# Patient Record
Sex: MALE | Race: WHITE | Employment: FULL TIME | URBAN - METROPOLITAN AREA
[De-identification: names, ages, dates, MRNs, and addresses within clinical notes are randomized per-mention and may not be internally consistent; named-entity substitution may affect disease eponyms.]

---

## 2022-11-27 ENCOUNTER — HOSPITAL ENCOUNTER (OUTPATIENT)
Age: 47
Discharge: HOME | End: 2022-11-27
Payer: COMMERCIAL

## 2022-11-27 VITALS
BODY MASS INDEX: 23.32 KG/M2 | TEMPERATURE: 99 F | WEIGHT: 140 LBS | SYSTOLIC BLOOD PRESSURE: 157 MMHG | RESPIRATION RATE: 16 BRPM | OXYGEN SATURATION: 99 % | HEIGHT: 65 IN | DIASTOLIC BLOOD PRESSURE: 87 MMHG | HEART RATE: 61 BPM

## 2022-11-27 DIAGNOSIS — Z20.822 LAB TEST NEGATIVE FOR COVID-19 VIRUS: ICD-10-CM

## 2022-11-27 DIAGNOSIS — Z20.822 ENCOUNTER FOR LABORATORY TESTING FOR COVID-19 VIRUS: ICD-10-CM

## 2022-11-27 DIAGNOSIS — J06.9 VIRAL UPPER RESPIRATORY TRACT INFECTION: Primary | ICD-10-CM

## 2022-11-27 LAB — SARS-COV-2 RNA RESP QL NAA+PROBE: NOT DETECTED

## 2022-11-27 RX ORDER — ATORVASTATIN CALCIUM 10 MG/1
TABLET, FILM COATED ORAL
COMMUNITY
Start: 2022-11-12

## 2022-11-27 NOTE — ED INITIAL ASSESSMENT (HPI)
Pt presents to the IC with c/o headache, cough, congestion and chills at home. No fevers. Pt is concerned about covid with a new baby at home.

## 2022-11-27 NOTE — DISCHARGE INSTRUCTIONS
Recommend over-the-counter Flonase and Zyrtec to help with cough congestion runny nose or postnasal drip. Take ibuprofen or Tylenol for pain. You may wear a mask when you are around your  and good hand hygiene. Follow-up with your primary care provider if symptoms persist or worsen.

## 2023-03-09 ENCOUNTER — OFFICE VISIT (OUTPATIENT)
Dept: GASTROENTEROLOGY | Facility: CLINIC | Age: 48
End: 2023-03-09
Payer: COMMERCIAL

## 2023-03-09 ENCOUNTER — TELEPHONE (OUTPATIENT)
Dept: GASTROENTEROLOGY | Facility: CLINIC | Age: 48
End: 2023-03-09

## 2023-03-09 VITALS — WEIGHT: 148.38 LBS | BODY MASS INDEX: 25 KG/M2

## 2023-03-09 DIAGNOSIS — Z12.11 COLON CANCER SCREENING: Primary | ICD-10-CM

## 2023-03-09 DIAGNOSIS — Z12.11 SCREEN FOR COLON CANCER: Primary | ICD-10-CM

## 2023-03-09 PROCEDURE — S0285 CNSLT BEFORE SCREEN COLONOSC: HCPCS | Performed by: INTERNAL MEDICINE

## 2023-03-09 NOTE — PATIENT INSTRUCTIONS
1. Schedule colonoscopy with [facility] [Diagnosis: screening]    2.  bowel prep from pharmacy (single dose Golytely)    3. Continue all medications as normal for your procedure. 4. Read all bowel prep instructions carefully. Bowel prep instructions can also be found online at:  www.health.org/giprep     5. AVOID seeds, nuts, popcorn, raw fruits and vegetables for 3 days before procedure    6. You MAY need to go for COVID testing 72 hours before procedure. The testing team will call you a few days before your procedure to discuss with you if testing is required. If you are asked to go for COVID testing and do not completed the test, the procedure cannot be performed. 7. If you start any NEW medication after your visit today, please notify us. Certain medications (like iron or weight loss medications) will need to be held before the procedure, or the procedure cannot be performed safely.

## 2023-03-09 NOTE — TELEPHONE ENCOUNTER
Scheduled for:  Colonoscopy [address] or [phone]  Provider Name:  Dr. Betzaida Pillai  Date:  6/7/23  Location:  [facility]  Sedation: [facility]   Time: 10:00 am(pt is aware to arrive at 9:00 am)  Prep: single dose golytely  Meds/Allergies Reconciled?:  Physician reviewed      Diagnosis with codes:  Colon screening z12.11  Was patient informed to call insurance with codes (Y/N):  Yes   Referral sent?:yes       [address] [place] [address] or [address] [place] [address] notified?:  I sent an electronic request to Endo Scheduling and received a confirmation today. Medication Orders:  Pt is aware to NOT take iron pills, herbal meds and diet supplements for 7 days before exam. Also to NOT take any form of alcohol, recreational drugs and any forms of ED meds 24 hours before exam.   Misc Orders:       Further instructions given by staff:  I discussed the prep instructions with the patient which he  verbally understood and is aware that I will mail the instructions today.

## 2023-03-17 ENCOUNTER — TELEPHONE (OUTPATIENT)
Facility: CLINIC | Age: 48
End: 2023-03-17

## 2023-03-17 DIAGNOSIS — Z12.11 SCREENING FOR COLON CANCER: Primary | ICD-10-CM

## 2023-04-09 ENCOUNTER — HOSPITAL ENCOUNTER (OUTPATIENT)
Age: 48
Discharge: HOME | End: 2023-04-09
Payer: COMMERCIAL

## 2023-04-09 VITALS
RESPIRATION RATE: 20 BRPM | TEMPERATURE: 98 F | HEART RATE: 98 BPM | DIASTOLIC BLOOD PRESSURE: 99 MMHG | OXYGEN SATURATION: 98 % | SYSTOLIC BLOOD PRESSURE: 142 MMHG

## 2023-04-09 DIAGNOSIS — B96.89 ACUTE BACTERIAL SINUSITIS: Primary | ICD-10-CM

## 2023-04-09 DIAGNOSIS — J01.90 ACUTE BACTERIAL SINUSITIS: Primary | ICD-10-CM

## 2023-04-09 DIAGNOSIS — Z20.822 ENCOUNTER FOR LABORATORY TESTING FOR COVID-19 VIRUS: ICD-10-CM

## 2023-04-09 LAB
S PYO AG THROAT QL: NEGATIVE
SARS-COV-2 RNA RESP QL NAA+PROBE: NOT DETECTED

## 2023-04-09 RX ORDER — FLUTICASONE PROPIONATE 50 MCG
2 SPRAY, SUSPENSION (ML) NASAL DAILY
Qty: 16 G | Refills: 0 | Status: SHIPPED | OUTPATIENT
Start: 2023-04-09 | End: 2023-05-09

## 2023-04-09 RX ORDER — AMOXICILLIN AND CLAVULANATE POTASSIUM 875; 125 MG/1; MG/1
1 TABLET, FILM COATED ORAL 2 TIMES DAILY
Qty: 20 TABLET | Refills: 0 | Status: SHIPPED | OUTPATIENT
Start: 2023-04-09 | End: 2023-04-19

## 2023-04-09 NOTE — ED INITIAL ASSESSMENT (HPI)
Cough, ha, nasal congestion x 6 days. Since this past Friday (2 days) smell a fuel smell and it won't go away.

## 2023-06-14 ENCOUNTER — HOSPITAL ENCOUNTER (OUTPATIENT)
Facility: HOSPITAL | Age: 48
Setting detail: HOSPITAL OUTPATIENT SURGERY
Discharge: HOME | End: 2023-06-14
Attending: INTERNAL MEDICINE | Admitting: INTERNAL MEDICINE
Payer: COMMERCIAL

## 2023-06-14 ENCOUNTER — ANESTHESIA EVENT (OUTPATIENT)
Dept: ENDOSCOPY | Facility: HOSPITAL | Age: 48
End: 2023-06-14
Payer: COMMERCIAL

## 2023-06-14 ENCOUNTER — ANESTHESIA (OUTPATIENT)
Dept: ENDOSCOPY | Facility: HOSPITAL | Age: 48
End: 2023-06-14
Payer: COMMERCIAL

## 2023-06-14 VITALS
OXYGEN SATURATION: 100 % | HEIGHT: 65 IN | HEART RATE: 38 BPM | DIASTOLIC BLOOD PRESSURE: 82 MMHG | BODY MASS INDEX: 23.32 KG/M2 | RESPIRATION RATE: 15 BRPM | SYSTOLIC BLOOD PRESSURE: 119 MMHG | WEIGHT: 140 LBS

## 2023-06-14 DIAGNOSIS — Z12.11 COLON CANCER SCREENING: ICD-10-CM

## 2023-06-14 PROCEDURE — 45385 COLONOSCOPY W/LESION REMOVAL: CPT | Performed by: INTERNAL MEDICINE

## 2023-06-14 PROCEDURE — 0DBK8ZX EXCISION OF ASCENDING COLON, VIA NATURAL OR ARTIFICIAL OPENING ENDOSCOPIC, DIAGNOSTIC: ICD-10-PCS | Performed by: INTERNAL MEDICINE

## 2023-06-14 PROCEDURE — 0DBN8ZX EXCISION OF SIGMOID COLON, VIA NATURAL OR ARTIFICIAL OPENING ENDOSCOPIC, DIAGNOSTIC: ICD-10-PCS | Performed by: INTERNAL MEDICINE

## 2023-06-14 PROCEDURE — 0DBH8ZX EXCISION OF CECUM, VIA NATURAL OR ARTIFICIAL OPENING ENDOSCOPIC, DIAGNOSTIC: ICD-10-PCS | Performed by: INTERNAL MEDICINE

## 2023-06-14 RX ORDER — SODIUM CHLORIDE, SODIUM LACTATE, POTASSIUM CHLORIDE, CALCIUM CHLORIDE 600; 310; 30; 20 MG/100ML; MG/100ML; MG/100ML; MG/100ML
INJECTION, SOLUTION INTRAVENOUS CONTINUOUS
Status: DISCONTINUED | OUTPATIENT
Start: 2023-06-14 | End: 2023-06-14

## 2023-06-14 RX ORDER — LIDOCAINE HYDROCHLORIDE 10 MG/ML
INJECTION, SOLUTION EPIDURAL; INFILTRATION; INTRACAUDAL; PERINEURAL AS NEEDED
Status: DISCONTINUED | OUTPATIENT
Start: 2023-06-14 | End: 2023-06-14 | Stop reason: SURG

## 2023-06-14 RX ORDER — SODIUM CHLORIDE 0.9 % (FLUSH) 0.9 %
10 SYRINGE (ML) INJECTION AS NEEDED
Status: DISCONTINUED | OUTPATIENT
Start: 2023-06-14 | End: 2023-06-14

## 2023-06-14 RX ADMIN — LIDOCAINE HYDROCHLORIDE 50 MG: 10 INJECTION, SOLUTION EPIDURAL; INFILTRATION; INTRACAUDAL; PERINEURAL at 10:36:00

## 2023-06-14 RX ADMIN — SODIUM CHLORIDE, SODIUM LACTATE, POTASSIUM CHLORIDE, CALCIUM CHLORIDE: 600; 310; 30; 20 INJECTION, SOLUTION INTRAVENOUS at 10:33:00

## 2023-06-14 RX ADMIN — SODIUM CHLORIDE, SODIUM LACTATE, POTASSIUM CHLORIDE, CALCIUM CHLORIDE: 600; 310; 30; 20 INJECTION, SOLUTION INTRAVENOUS at 10:56:00

## 2023-06-14 NOTE — DISCHARGE INSTRUCTIONS

## 2023-06-14 NOTE — ANESTHESIA POSTPROCEDURE EVALUATION
Patient: Beau Lat    Procedure Summary     Date: 06/14/23 Room / Location: [address] [place] [address] ENDOSCOPY 01 / [address] [place] [address] ENDOSCOPY    Anesthesia Start: 1033 Anesthesia Stop: 1100    Procedure: COLONOSCOPY Diagnosis:       Colon cancer screening      (polyps, hemorrhoids )    Surgeons: Rosi Macdonald MD Anesthesiologist: Mikayla Platt CRNA    Anesthesia Type: general ASA Status: 1          Anesthesia Type: general    Vitals Value Taken Time   /69 06/14/23 1100   Temp 97 06/14/23 1100   Pulse 55 06/14/23 1059   Resp 16 06/14/23 1100   SpO2 98 % 06/14/23 1059   Vitals shown include unvalidated device data.     [address] [place] [address] AN Post Evaluation:   Patient Evaluated in PACU  Patient Participation: complete - patient participated  Level of Consciousness: awake  Pain Score: 0  Pain Management: adequate  Airway Patency:patent  Dental exam unchanged from preop  Yes    Cardiovascular Status: acceptable  Respiratory Status: acceptable  Postoperative Hydration acceptable      Charlie Long CRNA  6/14/2023 11:00 AM

## 2023-06-21 ENCOUNTER — TELEPHONE (OUTPATIENT)
Facility: CLINIC | Age: 48
End: 2023-06-21

## 2023-06-21 NOTE — TELEPHONE ENCOUNTER
Tubular adenomas x3 d/w patient - repeat CLN in 3 year. He agrees to plan.         GI staff: please place recall for colonoscopy in 3 years

## 2023-06-21 NOTE — TELEPHONE ENCOUNTER
Health maintenance updated. Last colonoscopy done 6/14/23. 3 year recall placed into Pt Outreach, next due on 6/14/26 per Dr. Ronny Rubin.

## 2024-12-19 ENCOUNTER — HOSPITAL ENCOUNTER (OUTPATIENT)
Age: 49
Discharge: HOME | End: 2024-12-19
Payer: COMMERCIAL

## 2024-12-19 VITALS
HEART RATE: 63 BPM | RESPIRATION RATE: 18 BRPM | SYSTOLIC BLOOD PRESSURE: 143 MMHG | TEMPERATURE: 99 F | OXYGEN SATURATION: 100 % | DIASTOLIC BLOOD PRESSURE: 79 MMHG

## 2024-12-19 DIAGNOSIS — R52 BODY ACHES: ICD-10-CM

## 2024-12-19 DIAGNOSIS — J06.9 VIRAL URI WITH COUGH: Primary | ICD-10-CM

## 2024-12-19 DIAGNOSIS — Z20.822 ENCOUNTER FOR LABORATORY TESTING FOR COVID-19 VIRUS: ICD-10-CM

## 2024-12-19 LAB
POCT INFLUENZA A: NEGATIVE
POCT INFLUENZA B: NEGATIVE
S PYO AG THROAT QL: NEGATIVE
SARS-COV-2 RNA RESP QL NAA+PROBE: NOT DETECTED

## 2024-12-19 PROCEDURE — U0002 COVID-19 LAB TEST NON-CDC: HCPCS | Performed by: NURSE PRACTITIONER

## 2024-12-19 PROCEDURE — 87880 STREP A ASSAY W/OPTIC: CPT | Performed by: NURSE PRACTITIONER

## 2024-12-19 PROCEDURE — 87502 INFLUENZA DNA AMP PROBE: CPT | Performed by: NURSE PRACTITIONER

## 2024-12-19 PROCEDURE — 99214 OFFICE O/P EST MOD 30 MIN: CPT | Performed by: NURSE PRACTITIONER

## 2024-12-19 RX ORDER — BENZONATATE 100 MG/1
100 CAPSULE ORAL 3 TIMES DAILY PRN
Qty: 30 CAPSULE | Refills: 0 | Status: SHIPPED | OUTPATIENT
Start: 2024-12-19 | End: 2024-12-29

## 2024-12-19 NOTE — ED PROVIDER NOTES
Patient Seen in: [facility]      History     Chief Complaint   Patient presents with    Sore Throat    Cough/URI     Stated Complaint: Cough    Subjective: This is a 49-year-old male, past medical history of hyperlipidemia, presents to immediate care clinic for evaluation of fever, chills, headache, body aches, cough, congestion, runny nose, sneezing, sore throat for the past 3 days.  Patient states symptoms feel somewhat similar to when he had strep throat over the summer.  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  He is afebrile on arrival.  He is without abdominal pain, nausea, vomiting, diarrhea.  He has not been taking any over-the-counter medications, states he has been focusing on staying well-hydrated, getting plenty of rest, warm tea.  Patient is speaking in complete full sentences without difficulty.  AOx4.  The history is provided by the patient.             Objective:     Past Medical History:    Hyperlipidemia    Tubular adenoma of colon    repeat CLN in 2026              Past Surgical History:   Procedure Laterality Date    Colonoscopy N/A 6/14/2023    Procedure: COLONOSCOPY;  Surgeon: CARO Wood MD;  Location: [facility] ENDOSCOPY                Social History     Socioeconomic History    Marital status:    Tobacco Use    Smoking status: Never     Passive exposure: Never    Smokeless tobacco: Never   Substance and Sexual Activity    Alcohol use: Yes     Comment: socially    Drug use: Yes     Frequency: 2.0 times per week     Types: Cannabis     Comment: week ago     Social Drivers of Health      Received from [facility]    Housing Stability              Review of Systems   Constitutional:  Positive for activity change, appetite change, chills, fatigue and fever.   HENT:  Positive for congestion, postnasal drip, rhinorrhea, sneezing and sore throat. Negative for ear discharge and ear pain.    Eyes: Negative.    Respiratory:  Positive  for cough. Negative for chest tightness, shortness of breath, wheezing and stridor.    Cardiovascular: Negative.    Gastrointestinal: Negative.    Genitourinary: Negative.    Musculoskeletal:  Positive for arthralgias and myalgias.   Skin: Negative.    Neurological:  Positive for headaches. Negative for dizziness, weakness and light-headedness.       Positive for stated complaint: Cough  Other systems are as noted in HPI.  Constitutional and vital signs reviewed.      All other systems reviewed and negative except as noted above.    Physical Exam     ED Triage Vitals   BP 12/19/24 1619 143/79   Pulse 12/19/24 1619 63   Resp 12/19/24 1617 18   Temp 12/19/24 1617 98.9 °F (37.2 °C)   Temp src 12/19/24 1617 Oral   SpO2 12/19/24 1617 100 %   O2 Device 12/19/24 1617 None (Room air)       Current Vitals:   Vital Signs  BP: 143/79  Pulse: 63  Resp: 18  Temp: 98.9 °F (37.2 °C)  Temp src: Oral    Oxygen Therapy  SpO2: 100 %  O2 Device: None (Room air)        Physical Exam  Constitutional:       Appearance: He is well-developed.   HENT:      Head: Normocephalic.      Right Ear: Tympanic membrane and ear canal normal.      Left Ear: Tympanic membrane and ear canal normal.      Nose: Congestion and rhinorrhea present.      Mouth/Throat:      Mouth: Mucous membranes are moist. No oral lesions.      Pharynx: Uvula midline. Posterior oropharyngeal erythema present. No pharyngeal swelling, oropharyngeal exudate or uvula swelling.      Tonsils: No tonsillar exudate or tonsillar abscesses.   Eyes:      Conjunctiva/sclera: Conjunctivae normal.      Pupils: Pupils are equal, round, and reactive to light.   Cardiovascular:      Rate and Rhythm: Normal rate and regular rhythm.      Heart sounds: Normal heart sounds. No murmur heard.  Pulmonary:      Effort: Pulmonary effort is normal. No respiratory distress.      Breath sounds: No stridor. No wheezing, rhonchi or rales.   Chest:      Chest wall: No tenderness.   Musculoskeletal:       Cervical back: Normal range of motion.   Lymphadenopathy:      Cervical: No cervical adenopathy.   Skin:     General: Skin is warm.      Capillary Refill: Capillary refill takes less than 2 seconds.   Neurological:      General: No focal deficit present.      Mental Status: He is alert and oriented to person, place, and time.             ED Course     Labs Reviewed   POCT RAPID STREP - Normal   POCT FLU TEST - Normal    Narrative:     This assay is a rapid molecular in vitro test utilizing nucleic acid amplification of influenza A and B viral RNA.   RAPID SARS-COV-2 BY PCR - Normal                   MDM      Differentials considered include: COVID-19, influenza A, influenza B, strep throat, viral URI, pneumonia.    Patient is negative for COVID-19.  His lungs are clear on exam.  No wheezing, coarse or diminished breath sounds.  No rhonchi or rales.  No tachypnea, tachycardia, hypoxia.  No evidence to suggest pneumonia.  Chest x-ray withheld.    Patient is negative for strep throat.  Posterior pharynx with mild erythema, however, postnasal drip in appearance.  Uvula is midline normal in size.  No tonsillar enlargement, edema, exudate.  No cervical lymphadenopathy.  Patient is tolerating his own secretions well without difficulty.  No excessive drooling. No evidence of PTA.    Patient is negative for influenza A and influenza B.    Symptoms are likely viral in nature.  Tessalon Perles sent to pharmacy to help suppress cough.  Patient is aware of additional supportive and symptomatic management at home.    Educated patient on typical length and duration respiratory viruses lasting 2 weeks or longer.  He is aware of signs symptoms that warrant reevaluation by his PCP as well as signs symptoms that warrant immediate ER evaluation.            Medical Decision Making      Disposition and Plan     Clinical Impression:  1. Viral URI with cough    2. Body aches    3. Encounter for laboratory testing for COVID-19 virus          Disposition:  Discharge  12/19/2024  5:08 pm    Follow-up:  Sean Van MD  [address] [address] [phone]    In 7 days  If symptoms worsen          Medications Prescribed:  Current Discharge Medication List        START taking these medications    Details   benzonatate 100 MG Oral Cap Take 1 capsule (100 mg total) by mouth 3 (three) times daily as needed for cough.  Qty: 30 capsule, Refills: 0                 Supplementary Documentation:

## 2024-12-19 NOTE — ED INITIAL ASSESSMENT (HPI)
Pt here with complaints of cough , body aches, sore throat , fever , headache, fatigue and congestion that began 3 days ago , pt denies any sob

## 2024-12-19 NOTE — DISCHARGE INSTRUCTIONS
Negative for COVID-19, influenza A, influenza B, strep throat.    Your lungs were clear on exam.  No concerns for pneumonia    Symptoms are likely viral in nature.  We are seeing a large uptake of respiratory viruses at this time.  Respiratory viruses can typically last anywhere from 2 weeks or longer with coughing last symptom to resolve.    I sent a cough suppressant to your pharmacy that you may use 3 times a day as needed.  I recommend drinking plenty water and get plenty of rest.  Sleep somewhat elevated and upright.  Sleep with humidifier.  Steam showers for cough and congestion.  Honey at bedtime to help mitigate the cough.    Drink plenty of water and get plenty of rest.  Continue with Tylenol every 4 hours and Motrin every 6 hours.    If you do not feel improved in 7 to 10 days, please follow-up with your primary care doctor.    Please go to ER if you have any chest pain, dizziness, lightheadedness, palpitations, shortness of breath.

## (undated) DEVICE — KIT CLEAN ENDOKIT 1.1OZ GOWNX2

## (undated) DEVICE — SNARE ENDOSCOPIC 10MM ROUND

## (undated) DEVICE — 60 ML SYRINGE REGULAR TIP: Brand: MONOJECT

## (undated) DEVICE — Device: Brand: DUAL NARE NASAL CANNULAE FEMALE LUER CON 7FT O2 TUBE

## (undated) DEVICE — KIT ENDO ORCAPOD 160/180/190

## (undated) DEVICE — MEDI-VAC NON-CONDUCTIVE SUCTION TUBING 6MM X 1.8M (6FT.) L: Brand: CARDINAL HEALTH

## (undated) DEVICE — SNARE OPTMZ PLPCTM TRP

## (undated) NOTE — LETTER
[address]  Authorization for Surgical Operation and Procedure                                                                                           I hereby authorize Jorge Servin MD, my physician and his/her assistants (if applicable), which may include medical students, residents, and/or fellows, to perform the following surgical operation/ procedure and administer such anesthesia as may be determined necessary by my physician: Operation/Procedure name (s) COLONOSCOPY on [address] [place] [address]   2. I recognize that during the surgical operation/procedure, unforeseen conditions may necessitate additional or different procedures than those listed above. I, therefore, further authorize and request that the above-named surgeon, assistants, or designees perform such procedures as are, in their judgment, necessary and desirable. 3.   My surgeon/physician has discussed prior to my surgery the potential benefits, risks and side effects of this procedure; the likelihood of achieving goals; and potential problems that might occur during recuperation. They also discussed reasonable alternatives to the procedure, including risks, benefits, and side effects related to the alternatives and risks related to not receiving this procedure. I have had all my questions answered and I acknowledge that no guarantee has been made as to the result that may be obtained. 4.   Should the need arise during my operation/procedure, which includes change of level of care prior to discharge, I also consent to the administration of blood and/or blood products. Further, I understand that despite careful testing and screening of blood or blood products by collecting agencies, I may still be subject to ill effects as a result of receiving a blood transfusion and/or blood products.   The following are some, but not all, of the potential risks that can occur: fever and allergic reactions, hemolytic reactions, transmission of diseases such as Hepatitis, AIDS and Cytomegalovirus (CMV) and fluid overload. In the event that I wish to have an autologous transfusion of my own blood, or a directed donor transfusion, I will discuss this with my physician. Check only if Refusing Blood or Blood Products  I understand refusal of blood or blood products as deemed necessary by my physician may have serious consequences to my condition to include possible death. I hereby assume responsibility for my refusal and release the hospital, its personnel, and my physicians from any responsibility for the consequences of my refusal.    o  Refuse   5. I authorize the use of any specimen, organs, tissues, body parts or foreign objects that may be removed from my body during the operation/procedure for diagnosis, research or teaching purposes and their subsequent disposal by hospital authorities. I also authorize the release of specimen test results and/or written reports to my treating physician on the hospital medical staff or other referring or consulting physicians involved in my care, at the discretion of the Pathologist or my treating physician. 6.   I consent to the photographing or videotaping of the operations or procedures to be performed, including appropriate portions of my body for medical, scientific, or educational purposes, provided my identity is not revealed by the pictures or by descriptive texts accompanying them. If the procedure has been photographed/videotaped, the surgeon will obtain the original picture, image, videotape or CD. The hospital will not be responsible for storage, release or maintenance of the picture, image, tape or CD.    7.   I consent to the presence of a  or observers in the operating room as deemed necessary by my physician or their designees.     8.   I recognize that in the event my procedure results in extended X-Ray/fluoroscopy time, I may develop a skin reaction. 9. If I have a Do Not Attempt Resuscitation (DNAR) order in place, that status will be suspended while in the operating room, procedural suite, and during the recovery period unless otherwise explicitly stated by me (or a person authorized to consent on my behalf). The surgeon or my attending physician will determine when the applicable recovery period ends for purposes of reinstating the DNAR order. 10. Patients having a sterilization procedure: I understand that if the procedure is successful the results will be permanent and it will therefore be impossible for me to inseminate, conceive, or bear children. I also understand that the procedure is intended to result in sterility, although the result has not been guaranteed. 11. I acknowledge that my physician has explained sedation/analgesia administration to me including the risk and benefits I consent to the administration of sedation/analgesia as may be necessary or desirable in the judgment of my physician. I CERTIFY THAT I HAVE READ AND FULLY UNDERSTAND THE ABOVE CONSENT TO OPERATION and/or OTHER PROCEDURE.     _________________________________________ _________________________________     ___________________________________  Signature of Patient     Signature of Responsible Person                   Printed Name of Responsible Person                              _________________________________________ ______________________________        ___________________________________  Signature of Witness         Date  Time         Relationship to Patient    STATEMENT OF PHYSICIAN My signature below affirms that prior to the time of the procedure; I have explained to the patient and/or his/her legal representative, the risks and benefits involved in the proposed treatment and any reasonable alternative to the proposed treatment.  I have also explained the risks and benefits involved in refusal of the proposed treatment and alternatives to the proposed treatment and have answered the patient's questions.  If I have a significant financial interest in a co-management agreement or a significant financial interest in any product or implant, or other significant relationship used in this procedure/surgery, I have disclosed this and had a discussion with my patient.     _______________________________________________________________ _____________________________  Ashley Garcia Physician)                                                                                         (Date)                                   (Time)  Patient Name: Shannan Godinez    : 1975   Printed: 2023      Medical Record #: [id number]                                              Page 1 of 1